# Patient Record
Sex: MALE | Race: WHITE | Employment: UNEMPLOYED | URBAN - METROPOLITAN AREA
[De-identification: names, ages, dates, MRNs, and addresses within clinical notes are randomized per-mention and may not be internally consistent; named-entity substitution may affect disease eponyms.]

---

## 2017-01-19 ENCOUNTER — TELEPHONE (OUTPATIENT)
Dept: PULMONOLOGY | Age: 52
End: 2017-01-19

## 2017-02-01 ENCOUNTER — HOSPITAL ENCOUNTER (OUTPATIENT)
Dept: CT IMAGING | Facility: MEDICAL CENTER | Age: 52
Discharge: HOME | End: 2017-02-01
Attending: INTERNAL MEDICINE | Admitting: INTERNAL MEDICINE

## 2017-02-01 DIAGNOSIS — R91.1 PULMONARY NODULE: ICD-10-CM

## 2017-02-01 DIAGNOSIS — R91.1 SOLITARY PULMONARY NODULE: ICD-10-CM

## 2017-04-05 ENCOUNTER — OFFICE VISIT (OUTPATIENT)
Dept: PULMONOLOGY | Age: 52
End: 2017-04-05

## 2017-04-05 VITALS
DIASTOLIC BLOOD PRESSURE: 64 MMHG | OXYGEN SATURATION: 90 % | BODY MASS INDEX: 25.55 KG/M2 | WEIGHT: 159 LBS | HEIGHT: 66 IN | RESPIRATION RATE: 18 BRPM | SYSTOLIC BLOOD PRESSURE: 120 MMHG | TEMPERATURE: 98.3 F | HEART RATE: 62 BPM

## 2017-04-05 DIAGNOSIS — R09.02 HYPOXIA: ICD-10-CM

## 2017-04-05 DIAGNOSIS — R91.1 PULMONARY NODULE: Primary | ICD-10-CM

## 2017-04-05 DIAGNOSIS — Z72.0 TOBACCO ABUSE: ICD-10-CM

## 2017-04-05 DIAGNOSIS — J44.9 CHRONIC OBSTRUCTIVE PULMONARY DISEASE, UNSPECIFIED COPD TYPE (HCC): ICD-10-CM

## 2017-04-05 PROCEDURE — G8419 CALC BMI OUT NRM PARAM NOF/U: HCPCS | Performed by: INTERNAL MEDICINE

## 2017-04-05 PROCEDURE — G8427 DOCREV CUR MEDS BY ELIG CLIN: HCPCS | Performed by: INTERNAL MEDICINE

## 2017-04-05 PROCEDURE — 4004F PT TOBACCO SCREEN RCVD TLK: CPT | Performed by: INTERNAL MEDICINE

## 2017-04-05 PROCEDURE — 99214 OFFICE O/P EST MOD 30 MIN: CPT | Performed by: INTERNAL MEDICINE

## 2017-04-05 PROCEDURE — 3023F SPIROM DOC REV: CPT | Performed by: INTERNAL MEDICINE

## 2017-04-05 PROCEDURE — 3017F COLORECTAL CA SCREEN DOC REV: CPT | Performed by: INTERNAL MEDICINE

## 2017-04-05 PROCEDURE — G8926 SPIRO NO PERF OR DOC: HCPCS | Performed by: INTERNAL MEDICINE

## 2017-04-05 RX ORDER — ALBUTEROL SULFATE 90 UG/1
2 AEROSOL, METERED RESPIRATORY (INHALATION) EVERY 6 HOURS PRN
Qty: 1 INHALER | Refills: 5 | Status: SHIPPED | OUTPATIENT
Start: 2017-04-05 | End: 2017-10-16 | Stop reason: SDUPTHER

## 2017-04-05 RX ORDER — FLUTICASONE FUROATE AND VILANTEROL 100; 25 UG/1; UG/1
1 POWDER RESPIRATORY (INHALATION) DAILY
Qty: 28 EACH | Refills: 5 | Status: SHIPPED | OUTPATIENT
Start: 2017-04-05 | End: 2017-10-16 | Stop reason: SDUPTHER

## 2017-05-01 RX ORDER — RIVAROXABAN 20 MG/1
TABLET, FILM COATED ORAL
Qty: 30 TABLET | Refills: 1 | Status: SHIPPED | OUTPATIENT
Start: 2017-05-01 | End: 2017-07-21 | Stop reason: SDUPTHER

## 2017-07-24 RX ORDER — RIVAROXABAN 20 MG/1
TABLET, FILM COATED ORAL
Qty: 30 TABLET | Refills: 1 | Status: SHIPPED | OUTPATIENT
Start: 2017-07-24 | End: 2017-09-27 | Stop reason: SDUPTHER

## 2017-09-27 RX ORDER — RIVAROXABAN 20 MG/1
TABLET, FILM COATED ORAL
Qty: 30 TABLET | Refills: 1 | Status: SHIPPED | OUTPATIENT
Start: 2017-09-27 | End: 2017-11-24 | Stop reason: SDUPTHER

## 2017-10-16 ENCOUNTER — OFFICE VISIT (OUTPATIENT)
Dept: PULMONOLOGY | Age: 52
End: 2017-10-16

## 2017-10-16 ENCOUNTER — HOSPITAL ENCOUNTER (OUTPATIENT)
Dept: CT IMAGING | Age: 52
Discharge: HOME | End: 2017-10-16
Attending: INTERNAL MEDICINE | Admitting: INTERNAL MEDICINE

## 2017-10-16 VITALS
HEART RATE: 68 BPM | SYSTOLIC BLOOD PRESSURE: 130 MMHG | OXYGEN SATURATION: 93 % | DIASTOLIC BLOOD PRESSURE: 74 MMHG | HEIGHT: 66 IN | RESPIRATION RATE: 16 BRPM | BODY MASS INDEX: 25.81 KG/M2 | TEMPERATURE: 98.1 F | WEIGHT: 160.6 LBS

## 2017-10-16 DIAGNOSIS — R91.1 PULMONARY NODULE: Primary | ICD-10-CM

## 2017-10-16 DIAGNOSIS — Z72.0 TOBACCO ABUSE: ICD-10-CM

## 2017-10-16 DIAGNOSIS — R91.1 PULMONARY NODULE: ICD-10-CM

## 2017-10-16 DIAGNOSIS — J44.9 CHRONIC OBSTRUCTIVE PULMONARY DISEASE, UNSPECIFIED COPD TYPE (HCC): ICD-10-CM

## 2017-10-16 DIAGNOSIS — R91.1 SOLITARY PULMONARY NODULE: ICD-10-CM

## 2017-10-16 PROCEDURE — 99214 OFFICE O/P EST MOD 30 MIN: CPT | Performed by: INTERNAL MEDICINE

## 2017-10-16 PROCEDURE — 4004F PT TOBACCO SCREEN RCVD TLK: CPT | Performed by: INTERNAL MEDICINE

## 2017-10-16 PROCEDURE — 3023F SPIROM DOC REV: CPT | Performed by: INTERNAL MEDICINE

## 2017-10-16 PROCEDURE — G8419 CALC BMI OUT NRM PARAM NOF/U: HCPCS | Performed by: INTERNAL MEDICINE

## 2017-10-16 PROCEDURE — 3017F COLORECTAL CA SCREEN DOC REV: CPT | Performed by: INTERNAL MEDICINE

## 2017-10-16 PROCEDURE — G8926 SPIRO NO PERF OR DOC: HCPCS | Performed by: INTERNAL MEDICINE

## 2017-10-16 PROCEDURE — G8427 DOCREV CUR MEDS BY ELIG CLIN: HCPCS | Performed by: INTERNAL MEDICINE

## 2017-10-16 PROCEDURE — G8484 FLU IMMUNIZE NO ADMIN: HCPCS | Performed by: INTERNAL MEDICINE

## 2017-10-16 RX ORDER — ALBUTEROL SULFATE 90 UG/1
2 AEROSOL, METERED RESPIRATORY (INHALATION) EVERY 6 HOURS PRN
Qty: 1 INHALER | Refills: 5 | Status: SHIPPED | OUTPATIENT
Start: 2017-10-16

## 2017-10-16 RX ORDER — FLUTICASONE FUROATE AND VILANTEROL 100; 25 UG/1; UG/1
1 POWDER RESPIRATORY (INHALATION) DAILY
Qty: 28 EACH | Refills: 5 | Status: SHIPPED | OUTPATIENT
Start: 2017-10-16 | End: 2018-04-16 | Stop reason: SDUPTHER

## 2017-10-16 RX ORDER — MONTELUKAST SODIUM 10 MG/1
10 TABLET ORAL NIGHTLY
Refills: 1 | COMMUNITY
Start: 2017-09-18

## 2017-10-16 NOTE — PATIENT INSTRUCTIONS
Tips to Help You Stop Smoking (taken from Up-To-Date)      Cigarette smoking is a preventable cause of death in the [place]. Quitting smoking now can decrease your risk of getting smoking-related illnesses like heart disease, stroke, cancer & COPD. S = Set a quit date. T = Tell family, friends, and the people around you that you plan to quit. A = Anticipate or plan ahead for the tough times you'll face while quitting. R = Remove cigarettes and other tobacco products from your home, car, and work. T = Talk to your doctor about getting help to quit. Your doctor may give you medicines to reduce your craving for cigarettes & the unpleasant symptoms that happen when you stop smoking (called withdrawal symptoms). What are the symptoms of withdrawal?  The symptoms include:   ?Trouble sleeping   ? Being irritable, anxious or restless   ? Getting frustrated or angry   ? Having trouble thinking clearly  Nicotine replacement therapy eases withdrawal and reduces your bodys craving for nicotine, the main drug found in cigarettes. Non-prescription forms of nicotine replacement include skin patches, lozenges, and gum. Two prescription medications are available that have been proven to help people stop smoking:  ? Bupropion is a prescription medicine that reduces your desire to smoke. This medicine is sold under the brand names Zyban and Wellbutrin & as a generic formulation. ? Varenicline (brand name: Chantix) is a prescription medicine that reduces withdrawal symptoms and cigarette cravings. If you take bupropion or varenicline and you have any new or worsening depressed mood or thoughts of harming yourself or someone else, stop taking the medicine and call your doctor. Buproprion should not be taken by anyone with a history of seizure or epilepsy. Will I gain weight if I quit?  Yes, you might gain a few pounds.  But quitting smoking will have a much more positive effect on your health than weighing a

## 2017-10-16 NOTE — PROGRESS NOTES
Subjective:      Patient ID: Krysta Cannon is a 47 y.o. male, former patient of Dr. Aisha Banks. HPI    Mr Penny Boyd is in for F/U of COPD, hx of PEs. Interval History:   Since last clinic visit, the patient Reports he is more tired and short of breath. He has difficulty with any activities. He is still smoking about a pack per day. His son recently  and he feels very sad regarding this. Presenting History:   Mr Penny Boyd was referred by Dr Lianet Llyod for dyspnea/COPD. Could walk up a flight of stairs without a break, but would be short of breath at the top. Short of breath with walking 100 feet if walks fast. Using wife's oxygen at times due to shortness of breath.  smoked 1.5-2 ppd x 40 years. There are no changes to past medical history, family history, social history or review of systems(except as noted in the history section) since prior note (all reviewed with patient). Current Medications:    Current Outpatient Prescriptions:     XARELTO 20 MG TABS tablet, take 1 tablet by mouth daily WITH BREAKFAST, Disp: 30 tablet, Rfl: 1    albuterol sulfate HFA (PROAIR HFA) 108 (90 BASE) MCG/ACT inhaler, Inhale 2 puffs into the lungs every 6 hours as needed for Wheezing or Shortness of Breath, Disp: 1 Inhaler, Rfl: 5    fluticasone-vilanterol (BREO ELLIPTA) 100-25 MCG/INH AEPB inhaler, Inhale 1 puff into the lungs daily, Disp: 28 each, Rfl: 5    citalopram (CELEXA) 20 MG tablet, Take 1 tablet by mouth daily. , Disp: , Rfl:     lisinopril (PRINIVIL;ZESTRIL) 5 MG tablet, Take 1 tablet by mouth daily. , Disp: , Rfl:     LORazepam (ATIVAN) 1 MG tablet, Take 1 tablet by mouth 2 times daily. , Disp: , Rfl:     methadone (DOLOPHINE) 10 MG tablet, Take 1 tablet by mouth every 4 hours as needed. , Disp: , Rfl:     NITROSTAT 0.4 MG SL tablet, Place 1 tablet under the tongue daily as needed.  For emergencies only, Disp: , Rfl:     oxyCODONE-acetaminophen (PERCOCET)  MG per tablet, Take 1 tablet by mouth every 8 hours as needed. , Disp: , Rfl:     pravastatin (PRAVACHOL) 20 MG tablet, Take 1 tablet by mouth daily. , Disp: , Rfl:     metoprolol (TOPROL-XL) 100 MG XL tablet, Take 100 mg by mouth daily. , Disp: , Rfl:     ticagrelor (BRILINTA) 90 MG TABS tablet, Take 90 mg by mouth 2 times daily. , Disp: , Rfl:     aspirin 81 MG tablet, Take 81 mg by mouth daily. , Disp: , Rfl:     montelukast (SINGULAIR) 10 MG tablet, , Disp: , Rfl: 1    nicotine polacrilex (NICORETTE) 4 MG lozenge, Take 1 lozenge by mouth as needed for Smoking cessation, Disp: 100 each, Rfl: 2    NEXIUM 40 MG capsule, Take 1 capsule by mouth daily. , Disp: , Rfl:         Objective:   PHYSICAL EXAM:        VITALS:    /74 (Site: Left Arm, Position: Sitting, Cuff Size: Medium Adult)   Pulse 68   Temp 98.1 °F (36.7 °C) (Oral)   Resp 16   Ht 5' 6\" (1.676 m)   Wt 160 lb 9.6 oz (72.8 kg)   SpO2 93% Comment: ra  BMI 25.92 kg/m² RA improved to 90% with rest    Gen: In no acute distress. Alert. Thin. Eyes: PERRL. No sclera icterus. No conjunctival injection. ENT: No ocular or auricular discharge. Oropharynx clear. Neck: Trachea midline. Normal thyroid. Resp: No accessory muscle use. No crackles. Few wheezes. No rhonchi. No dullness on percussion. CV: Regular rate. Regular rhythm. No murmur or rub. Normal S1 and S2. No edema. GI: Abdomen non-tender. Non-distended. No masses. Skin: Warm and dry. No nodules on exposed extremities. No rash on exposed extremities. Lymph: No cervical LAD. No supraclavicular LAD. M/S: No cyanosis. No synovitis or joint deformity. No clubbing. Neuro: Cranial nerves are grossly intact. Moving all extremities. Motor and sensation grossly intact. Psych: Oriented x 3. No anxiety or agitation.        I personally reviewed and interpreted the following today in the office:    Chest CT 10/16/17: No significant change in lung nodules to my view; radiology report pending    Chest Ct 2/1/17: Stable noncalcified encourage more regular use  Imaging f/u not needed unless radiology report on nodules is different than mine  Smoking cessation encouraged again- patient reluctant  Pt declined influenza vaccine previously

## 2017-11-27 RX ORDER — RIVAROXABAN 20 MG/1
TABLET, FILM COATED ORAL
Qty: 30 TABLET | Refills: 5 | Status: SHIPPED | OUTPATIENT
Start: 2017-11-27 | End: 2018-10-02 | Stop reason: SDUPTHER

## 2018-02-28 ENCOUNTER — TELEPHONE (OUTPATIENT)
Dept: PULMONOLOGY | Age: 53
End: 2018-02-28

## 2018-02-28 NOTE — TELEPHONE ENCOUNTER
PA required for [facility]. PA submitted through CoverMyMeds. Awaiting determination     LOV: 10/16/17    Assessment:   1) Dyspnea- secondary to severe COPD  2) COPD- FEV1, 1.25L, gold stage 3  3) Tobacco abuse- Discussed cessation again  4) Pulmonary nodule ,  5 mm RML  5)  Exertional hypoxia  - PE 6/16                Plan:   Albuterol MDI- q 4 hours prn.    ·  Breo ellipta  ·  xarelto   · Pulm rehab referral made previously  · Oxygen at 4 lpm with exertion; encourage more regular use  · Imaging f/u not needed unless radiology report on nodules is different than mine  · Smoking cessation encouraged again- patient reluctant  · Pt declined influenza vaccine previously

## 2018-02-28 NOTE — TELEPHONE ENCOUNTER
Received an error message stating \"Cannot find matching patient with Name and Date of Birth provided. Please contact [employer] at [phone] to initiate a new prior authorization request\"    Will call patient tomorrow morning and confirm his insurance.

## 2018-04-16 ENCOUNTER — OFFICE VISIT (OUTPATIENT)
Dept: PULMONOLOGY | Age: 53
End: 2018-04-16

## 2018-04-16 VITALS
OXYGEN SATURATION: 90 % | WEIGHT: 161.6 LBS | SYSTOLIC BLOOD PRESSURE: 126 MMHG | BODY MASS INDEX: 24.49 KG/M2 | HEIGHT: 68 IN | HEART RATE: 106 BPM | TEMPERATURE: 98.2 F | RESPIRATION RATE: 20 BRPM | DIASTOLIC BLOOD PRESSURE: 72 MMHG

## 2018-04-16 DIAGNOSIS — J44.9 CHRONIC OBSTRUCTIVE PULMONARY DISEASE, UNSPECIFIED COPD TYPE (HCC): Primary | ICD-10-CM

## 2018-04-16 DIAGNOSIS — R09.02 HYPOXIA: ICD-10-CM

## 2018-04-16 DIAGNOSIS — Z72.0 TOBACCO ABUSE: ICD-10-CM

## 2018-04-16 DIAGNOSIS — R91.1 PULMONARY NODULE: ICD-10-CM

## 2018-04-16 PROCEDURE — 3017F COLORECTAL CA SCREEN DOC REV: CPT | Performed by: INTERNAL MEDICINE

## 2018-04-16 PROCEDURE — G8420 CALC BMI NORM PARAMETERS: HCPCS | Performed by: INTERNAL MEDICINE

## 2018-04-16 PROCEDURE — G8926 SPIRO NO PERF OR DOC: HCPCS | Performed by: INTERNAL MEDICINE

## 2018-04-16 PROCEDURE — 3023F SPIROM DOC REV: CPT | Performed by: INTERNAL MEDICINE

## 2018-04-16 PROCEDURE — G8427 DOCREV CUR MEDS BY ELIG CLIN: HCPCS | Performed by: INTERNAL MEDICINE

## 2018-04-16 PROCEDURE — 4004F PT TOBACCO SCREEN RCVD TLK: CPT | Performed by: INTERNAL MEDICINE

## 2018-04-16 PROCEDURE — 99214 OFFICE O/P EST MOD 30 MIN: CPT | Performed by: INTERNAL MEDICINE

## 2018-04-16 RX ORDER — VARENICLINE TARTRATE 1 MG/1
1 TABLET, FILM COATED ORAL 2 TIMES DAILY
Qty: 60 TABLET | Refills: 3 | Status: SHIPPED | OUTPATIENT
Start: 2018-04-16 | End: 2018-08-27

## 2018-04-16 RX ORDER — FLUTICASONE FUROATE AND VILANTEROL 100; 25 UG/1; UG/1
1 POWDER RESPIRATORY (INHALATION) DAILY
Qty: 28 EACH | Refills: 5 | Status: SHIPPED | OUTPATIENT
Start: 2018-04-16 | End: 2019-02-25 | Stop reason: SDUPTHER

## 2018-04-16 RX ORDER — VARENICLINE TARTRATE 25 MG
KIT ORAL
Qty: 1 EACH | Refills: 0 | Status: SHIPPED | OUTPATIENT
Start: 2018-04-16 | End: 2018-08-27

## 2018-07-27 ENCOUNTER — TELEPHONE (OUTPATIENT)
Dept: PULMONOLOGY | Age: 53
End: 2018-07-27

## 2018-07-27 DIAGNOSIS — J44.9 CHRONIC OBSTRUCTIVE PULMONARY DISEASE, UNSPECIFIED COPD TYPE (HCC): Primary | ICD-10-CM

## 2018-07-27 NOTE — TELEPHONE ENCOUNTER
Pts wife called in and said they are trying to get an [employer] for her . He needs an office visit and 6MW test to qualify him. I scheduled patient to see Dr. Sandee Mcdaniel on 8/27/18. Do you want patient to have a 6MW and PFT prior (last PFT/6MW was 9/23/15)? Last OV 4/16/18  Assessment:   1) Dyspnea- secondary to severe COPD  2) COPD- FEV1, 1.25L, gold stage 3  3) Tobacco abuse- Discussed cessation again  4) Pulmonary nodule ,  5 mm RML  5)  Exertional hypoxia  - PE 6/16                Plan:   Albuterol MDI- q 4 hours prn.    ·  Breo ellipta  ·  xarelto   · I discussed with this patient today the risks and benefits of various tobacco cessation strategies, including, but not limited to \"cold turkey,\" nicotine replacement, & Chantix.  We specifically discussed the risks of Chantix, with focus on side effects to include nausea, intense dreams, and spent considerable time focusing on the black box warning regarding depression.  The patient specifically denies suicidal ideation/homicidal ideation and was counseled to stop the product and immediately seek medical assistance for any worsening depression/mood disturbance.  The patient is encouraged to read enclosed literature for any prescribed medications  · Pulm rehab referral made previously  · Oxygen at 4 lpm with exertion; encourage more regular use  · repeat LDCT 10/18  · Smoking cessation encouraged again- patient reluctant  · Pt declined influenza vaccine previously

## 2018-07-30 NOTE — TELEPHONE ENCOUNTER
Patient called with message left for patient to call back to office. No PFT's available same day as appt. PFT scheduled for 8/15/18 at 3:00 pm at [facility].

## 2018-08-15 ENCOUNTER — HOSPITAL ENCOUNTER (OUTPATIENT)
Dept: PULMONOLOGY | Age: 53
Discharge: HOME | End: 2018-08-15
Payer: MEDICARE

## 2018-08-15 PROCEDURE — 94618 PULMONARY STRESS TESTING: CPT

## 2018-08-15 PROCEDURE — 94640 AIRWAY INHALATION TREATMENT: CPT

## 2018-08-15 PROCEDURE — 94664 DEMO&/EVAL PT USE INHALER: CPT

## 2018-08-15 PROCEDURE — 94729 DIFFUSING CAPACITY: CPT

## 2018-08-15 PROCEDURE — 94060 EVALUATION OF WHEEZING: CPT

## 2018-08-15 PROCEDURE — 94726 PLETHYSMOGRAPHY LUNG VOLUMES: CPT

## 2018-08-15 PROCEDURE — 6360000002 HC RX W HCPCS: Performed by: INTERNAL MEDICINE

## 2018-08-15 RX ORDER — ALBUTEROL SULFATE 2.5 MG/3ML
2.5 SOLUTION RESPIRATORY (INHALATION) ONCE
Status: COMPLETED | OUTPATIENT
Start: 2018-08-15 | End: 2018-08-15

## 2018-08-15 RX ADMIN — ALBUTEROL SULFATE 2.5 MG: 2.5 SOLUTION RESPIRATORY (INHALATION) at 14:50

## 2018-08-27 ENCOUNTER — OFFICE VISIT (OUTPATIENT)
Dept: PULMONOLOGY | Age: 53
End: 2018-08-27

## 2018-08-27 VITALS
HEIGHT: 69 IN | OXYGEN SATURATION: 90 % | DIASTOLIC BLOOD PRESSURE: 66 MMHG | SYSTOLIC BLOOD PRESSURE: 116 MMHG | HEART RATE: 82 BPM | WEIGHT: 164 LBS | TEMPERATURE: 98.5 F | RESPIRATION RATE: 20 BRPM | BODY MASS INDEX: 24.29 KG/M2

## 2018-08-27 DIAGNOSIS — R91.1 PULMONARY NODULE: ICD-10-CM

## 2018-08-27 DIAGNOSIS — J44.9 CHRONIC OBSTRUCTIVE PULMONARY DISEASE, UNSPECIFIED COPD TYPE (HCC): Primary | ICD-10-CM

## 2018-08-27 DIAGNOSIS — R09.02 HYPOXIA: ICD-10-CM

## 2018-08-27 DIAGNOSIS — Z72.0 TOBACCO ABUSE: ICD-10-CM

## 2018-08-27 PROCEDURE — 99214 OFFICE O/P EST MOD 30 MIN: CPT | Performed by: INTERNAL MEDICINE

## 2018-08-27 RX ORDER — AMLODIPINE BESYLATE 5 MG/1
TABLET ORAL
COMMUNITY
Start: 2018-07-23

## 2018-08-27 RX ORDER — TIZANIDINE 4 MG/1
4 TABLET ORAL EVERY 6 HOURS PRN
COMMUNITY

## 2018-08-27 RX ORDER — GABAPENTIN 600 MG/1
600 TABLET ORAL 3 TIMES DAILY
COMMUNITY

## 2018-08-27 NOTE — PROGRESS NOTES
Upon arriving in exam room patient was at 87% RA at rest. After 4 minutes recovery patient maintained saturation of 90%

## 2018-08-27 NOTE — PROGRESS NOTES
Subjective:      Patient ID: Beatriz Russell is a 46 y.o. male, former patient of Dr. Marvin SILVERMAN, [facility]. HPI    Mr Kasandra Rodríguez is in for F/U of COPD, hx of PEs. Interval History:   Since last clinic visit, the patient has been about the same. He tried Chantix but did not think it helped. He is still smoking. Presenting History:   Mr Kasandra Rodríguez was referred by Dr Jesus Kohler for dyspnea/COPD. Could walk up a flight of stairs without a break, but would be short of breath at the top. Short of breath with walking 100 feet if walks fast. Using wife's oxygen at times due to shortness of breath.  smoked 1.5-2 ppd x 40 years. There are no changes to past medical history, family history, social history or review of systems(except as noted in the history section) since prior note (all reviewed with patient). Current Medications:    Current Outpatient Prescriptions:     tiZANidine (ZANAFLEX) 4 MG tablet, Take 4 mg by mouth every 6 hours as needed, Disp: , Rfl:     gabapentin (NEURONTIN) 600 MG tablet, Take 600 mg by mouth 3 times daily. ., Disp: , Rfl:     amLODIPine (NORVASC) 5 MG tablet, , Disp: , Rfl:     fluticasone-vilanterol (BREO ELLIPTA) 100-25 MCG/INH AEPB inhaler, Inhale 1 puff into the lungs daily, Disp: 28 each, Rfl: 5    XARELTO 20 MG TABS tablet, take 1 tablet by mouth daily WITH BREAKFAST, Disp: 30 tablet, Rfl: 5    montelukast (SINGULAIR) 10 MG tablet, Take 10 mg by mouth nightly , Disp: , Rfl: 1    albuterol sulfate HFA (PROAIR HFA) 108 (90 Base) MCG/ACT inhaler, Inhale 2 puffs into the lungs every 6 hours as needed for Wheezing or Shortness of Breath, Disp: 1 Inhaler, Rfl: 5    lisinopril (PRINIVIL;ZESTRIL) 5 MG tablet, Take 1 tablet by mouth daily. , Disp: , Rfl:     LORazepam (ATIVAN) 1 MG tablet, Take 40 tablets by mouth daily. ., Disp: , Rfl:     methadone (DOLOPHINE) 10 MG tablet, Take 1 tablet by mouth every 4 hours as needed. , Disp: , Rfl:     NITROSTAT 0.4 MG SL tablet, Place 1 tablet under the tongue daily as needed. For emergencies only, Disp: , Rfl:     pravastatin (PRAVACHOL) 20 MG tablet, Take 1 tablet by mouth daily. , Disp: , Rfl:     metoprolol (TOPROL-XL) 100 MG XL tablet, Take 100 mg by mouth daily. , Disp: , Rfl:     ticagrelor (BRILINTA) 90 MG TABS tablet, Take 90 mg by mouth 2 times daily. , Disp: , Rfl:     aspirin 81 MG tablet, Take 81 mg by mouth daily. , Disp: , Rfl:         Objective:   PHYSICAL EXAM:        VITALS:    /66 (Site: Left Arm, Position: Sitting, Cuff Size: Large Adult)   Pulse 82   Temp 98.5 °F (36.9 °C) (Oral)   Resp 20   Ht 5' 9\" (1.753 m)   Wt 164 lb (74.4 kg)   SpO2 90% Comment: RA  BMI 24.22 kg/m²      Gen: In no acute distress. Alert. Thin. Eyes: PERRL. No sclera icterus. No conjunctival injection. ENT: No ocular or auricular discharge. Oropharynx clear. Neck: Trachea midline. Normal thyroid. Resp: No accessory muscle use. No crackles. Few wheezes. No rhonchi. No dullness on percussion. CV: Regular rate. Regular rhythm. No murmur or rub. Normal S1 and S2. No edema. GI: Abdomen non-tender. Non-distended. No masses. Skin: Warm and dry. No nodules on exposed extremities. No rash on exposed extremities. Lymph: No cervical LAD. No supraclavicular LAD. M/S: No cyanosis. No synovitis or joint deformity. No clubbing. Neuro: Cranial nerves are grossly intact. Moving all extremities. Motor and sensation grossly intact. Psych: Oriented x 3. No anxiety or agitation. I personally reviewed and interpreted the following in the office:    Chest CT 10/16/17: No significant change in lung nodules to my view    Chest Ct 2/1/17: Stable noncalcified right lung pulmonary nodules the largest of which  measures 5 mm. Stable superior mediastinal gas collection adjacent to the posterior right  trachea as described.     Chest CT 7/8/16: A small solid rounded noncalcified pulmonary nodule is present medially in the right middle lobe on axial image 81 and previously  Check ONO2 on RA

## 2018-08-27 NOTE — PATIENT INSTRUCTIONS
few pounds more. Plus, you can help prevent some weight gain by being more active and eating less. Taking the medicine bupropion might help control weight gain. What else can I do to improve my chances of quitting?  You can:  ?Start exercising. ?Stay away from smokers and places that you associate with smoking. If people close to you smoke, ask them to quit with you. ? Keep gum, hard candy, or something to put in your mouth handy. If you get a craving for a cigarette, try one of these instead. ?Dont give up, even if you start smoking again. It takes most people a few tries before they succeed. You can also get help from a free phone line (4-880-QUIT-NOW) or go online to www.smokefree.gov. Your pulmonary team is here to help you quit.   Call for assistance [phone]

## 2018-09-07 DIAGNOSIS — J44.9 CHRONIC OBSTRUCTIVE PULMONARY DISEASE, UNSPECIFIED COPD TYPE (HCC): Primary | ICD-10-CM

## 2018-10-03 RX ORDER — RIVAROXABAN 20 MG/1
TABLET, FILM COATED ORAL
Qty: 30 TABLET | Refills: 0 | Status: SHIPPED | OUTPATIENT
Start: 2018-10-03

## 2018-10-19 ENCOUNTER — TELEPHONE (OUTPATIENT)
Dept: PULMONOLOGY | Age: 53
End: 2018-10-19

## 2018-11-12 RX ORDER — RIVAROXABAN 20 MG/1
TABLET, FILM COATED ORAL
Qty: 30 TABLET | Refills: 0 | OUTPATIENT
Start: 2018-11-12

## 2018-11-13 ENCOUNTER — TELEPHONE (OUTPATIENT)
Dept: PULMONOLOGY | Age: 53
End: 2018-11-13

## 2018-11-13 RX ORDER — RIVAROXABAN 20 MG/1
TABLET, FILM COATED ORAL
Qty: 30 TABLET | Refills: 0 | OUTPATIENT
Start: 2018-11-13

## 2019-02-25 RX ORDER — FLUTICASONE FUROATE AND VILANTEROL 100; 25 UG/1; UG/1
1 POWDER RESPIRATORY (INHALATION) DAILY
Qty: 90 EACH | Refills: 1 | Status: SHIPPED | OUTPATIENT
Start: 2019-02-25